# Patient Record
Sex: FEMALE | Race: WHITE | NOT HISPANIC OR LATINO | Employment: UNEMPLOYED | URBAN - METROPOLITAN AREA
[De-identification: names, ages, dates, MRNs, and addresses within clinical notes are randomized per-mention and may not be internally consistent; named-entity substitution may affect disease eponyms.]

---

## 2017-01-01 ENCOUNTER — TELEPHONE (OUTPATIENT)
Dept: PHARMACY | Facility: CLINIC | Age: 62
End: 2017-01-01

## 2017-01-01 ENCOUNTER — TRANSFERRED RECORDS (OUTPATIENT)
Dept: HEALTH INFORMATION MANAGEMENT | Facility: CLINIC | Age: 62
End: 2017-01-01

## 2017-01-01 ENCOUNTER — MYC MEDICAL ADVICE (OUTPATIENT)
Dept: INFECTIOUS DISEASES | Facility: CLINIC | Age: 62
End: 2017-01-01

## 2017-01-01 ENCOUNTER — ONCOLOGY VISIT (OUTPATIENT)
Dept: ONCOLOGY | Facility: CLINIC | Age: 62
End: 2017-01-01
Attending: PHYSICIAN ASSISTANT
Payer: COMMERCIAL

## 2017-01-01 ENCOUNTER — TELEPHONE (OUTPATIENT)
Dept: INFECTIOUS DISEASES | Facility: CLINIC | Age: 62
End: 2017-01-01

## 2017-01-01 ENCOUNTER — HEALTH MAINTENANCE LETTER (OUTPATIENT)
Age: 62
End: 2017-01-01

## 2017-01-01 ENCOUNTER — CARE COORDINATION (OUTPATIENT)
Dept: ONCOLOGY | Facility: CLINIC | Age: 62
End: 2017-01-01

## 2017-01-01 ENCOUNTER — OFFICE VISIT (OUTPATIENT)
Dept: INFECTIOUS DISEASES | Facility: CLINIC | Age: 62
End: 2017-01-01
Attending: INTERNAL MEDICINE
Payer: MEDICARE

## 2017-01-01 ENCOUNTER — MEDICAL CORRESPONDENCE (OUTPATIENT)
Dept: HEALTH INFORMATION MANAGEMENT | Facility: CLINIC | Age: 62
End: 2017-01-01

## 2017-01-01 VITALS
DIASTOLIC BLOOD PRESSURE: 82 MMHG | TEMPERATURE: 98 F | BODY MASS INDEX: 24.8 KG/M2 | HEIGHT: 63 IN | WEIGHT: 140 LBS | SYSTOLIC BLOOD PRESSURE: 127 MMHG | RESPIRATION RATE: 18 BRPM | OXYGEN SATURATION: 97 % | HEART RATE: 110 BPM

## 2017-01-01 VITALS
BODY MASS INDEX: 25.5 KG/M2 | SYSTOLIC BLOOD PRESSURE: 147 MMHG | DIASTOLIC BLOOD PRESSURE: 73 MMHG | WEIGHT: 143.9 LBS | TEMPERATURE: 98.2 F | HEIGHT: 63 IN | HEART RATE: 105 BPM

## 2017-01-01 VITALS
DIASTOLIC BLOOD PRESSURE: 74 MMHG | SYSTOLIC BLOOD PRESSURE: 142 MMHG | HEART RATE: 108 BPM | BODY MASS INDEX: 25.66 KG/M2 | HEIGHT: 63 IN | TEMPERATURE: 98.3 F | WEIGHT: 144.8 LBS

## 2017-01-01 DIAGNOSIS — B37.9 CANDIDA INFECTION: Primary | ICD-10-CM

## 2017-01-01 DIAGNOSIS — R53.82 CHRONIC FATIGUE: ICD-10-CM

## 2017-01-01 DIAGNOSIS — B37.9 CANDIDA INFECTION: ICD-10-CM

## 2017-01-01 DIAGNOSIS — B27.90 EPSTEIN BARR VIRUS INFECTION: Primary | ICD-10-CM

## 2017-01-01 DIAGNOSIS — B27.90 EPSTEIN BARR VIRUS INFECTION: ICD-10-CM

## 2017-01-01 DIAGNOSIS — R59.0 HILAR ADENOPATHY: ICD-10-CM

## 2017-01-01 DIAGNOSIS — D89.42 IDIOPATHIC MAST CELL ACTIVATION SYNDROME (H): Primary | ICD-10-CM

## 2017-01-01 DIAGNOSIS — R94.5 ABNORMAL RESULTS OF LIVER FUNCTION STUDIES: ICD-10-CM

## 2017-01-01 DIAGNOSIS — D84.9 IMMUNODEFICIENCY (H): Primary | ICD-10-CM

## 2017-01-01 LAB
1,3 BETA GLUCAN SER-MCNC: 392 NG/ML
1,3 BETA GLUCAN SER-MCNC: ABNORMAL NG/ML
ALBUMIN SERPL-MCNC: 3.9 G/DL (ref 3.4–5)
ALBUMIN SERPL-MCNC: 4 G/DL (ref 3.4–5)
ALP SERPL-CCNC: 74 U/L (ref 40–150)
ALP SERPL-CCNC: 97 U/L (ref 40–150)
ALT SERPL W P-5'-P-CCNC: 38 U/L (ref 0–50)
ALT SERPL W P-5'-P-CCNC: 51 U/L (ref 0–50)
AST SERPL W P-5'-P-CCNC: 40 U/L (ref 0–45)
AST SERPL W P-5'-P-CCNC: 52 U/L (ref 0–45)
B-D GLUCAN INTERPRETATION (1,3): ABNORMAL
B-D GLUCAN INTERPRETATION (1,3): ABNORMAL
BACTERIA SPEC CULT: NO GROWTH
BASOPHILS # BLD AUTO: 0 10*3/UL
BASOPHILS NFR BLD AUTO: 1 %
BILIRUB DIRECT SERPL-MCNC: 0.1 MG/DL (ref 0–0.2)
BILIRUB DIRECT SERPL-MCNC: 0.1 MG/DL (ref 0–0.2)
BILIRUB SERPL-MCNC: 0.4 MG/DL (ref 0.2–1.3)
BILIRUB SERPL-MCNC: 0.5 MG/DL (ref 0.2–1.3)
CRP SERPL-MCNC: <2.9 MG/L (ref 0–8)
EBV AB VCA, IGG: 347 U/ML (ref 0–17.9)
EBV AB VCA, IGM: 116 U/ML (ref 0–35.9)
EBV CAPSID AB (IGG) - QUEST: ABNORMAL
EBV CAPSID AB (IGM) - QUEST: ABNORMAL
EBV EARLY ANTIGEN AB, IGG: 102 U/ML (ref 0–8.9)
EBV INTERPRETATION - QUEST: ABNORMAL
EBV INTERPRETATION - QUEST: ABNORMAL
EBV NUCLEAR ANTIGEN AB, IGG: 295 U/ML (ref 0–17.9)
EOSINOPHIL # BLD AUTO: 0.1 10*3/UL
EOSINOPHIL NFR BLD AUTO: 3 %
ERYTHROCYTE [DISTWIDTH] IN BLOOD BY AUTOMATED COUNT: 13.3 %
ERYTHROCYTE [SEDIMENTATION RATE] IN BLOOD BY WESTERGREN METHOD: 24 MM/H (ref 0–30)
HCT VFR BLD AUTO: 37.1 %
HEMOGLOBIN: 12.6 G/DL (ref 11.7–15.7)
IGM SERPL-MCNC: 287 MG/DL (ref 60–265)
IGM SERPL-MCNC: 290 MG/DL (ref 60–265)
IMMATURE GRANS (ABS): 0 X10E3/UL
IMMATURE GRANULOCYTES: 0 %
LYMPHOCYTES # BLD AUTO: 1.1 10*3/UL
LYMPHOCYTES NFR BLD AUTO: 26 %
Lab: NORMAL
M TB TUBERC IFN-G BLD QL: NEGATIVE
M TB TUBERC IFN-G/MITOGEN IGNF BLD: 0 IU/ML
MCH RBC QN AUTO: 34.2 PG
MCHC RBC AUTO-ENTMCNC: 34 G/DL
MCV RBC AUTO: 101 FL
MICRO REPORT STATUS: NORMAL
MICRO REPORT STATUS: NORMAL
MONOCYTES # BLD AUTO: 0.4 10*3/UL
MONOCYTES NFR BLD AUTO: 9 %
MYCOBACTERIUM SPEC CULT: NORMAL
NEUTROPHILS # BLD AUTO: 2.7 10*3/UL
NEUTROPHILS NFR BLD AUTO: 61 %
PLATELET COUNT - QUEST: 165 10^9/L (ref 150–450)
PROT SERPL-MCNC: 8 G/DL (ref 6.8–8.8)
PROT SERPL-MCNC: 8.2 G/DL (ref 6.8–8.8)
RBC # BLD AUTO: 3.68 10^12/L
SPECIMEN SOURCE: NORMAL
SPECIMEN SOURCE: NORMAL
VITAMIN D, 25-HYDROXY: 56.2 NG/ML (ref 30–100)
WBC # BLD AUTO: 4.4 10^9/L

## 2017-01-01 PROCEDURE — 25000125 ZZHC RX 250: Mod: ZF

## 2017-01-01 PROCEDURE — 80076 HEPATIC FUNCTION PANEL: CPT | Performed by: INTERNAL MEDICINE

## 2017-01-01 PROCEDURE — 99212 OFFICE O/P EST SF 10 MIN: CPT | Mod: ZF

## 2017-01-01 PROCEDURE — 90715 TDAP VACCINE 7 YRS/> IM: CPT | Mod: ZF

## 2017-01-01 PROCEDURE — 99213 OFFICE O/P EST LOW 20 MIN: CPT | Mod: 25,ZF

## 2017-01-01 PROCEDURE — 82784 ASSAY IGA/IGD/IGG/IGM EACH: CPT | Performed by: INTERNAL MEDICINE

## 2017-01-01 PROCEDURE — 86480 TB TEST CELL IMMUN MEASURE: CPT | Performed by: INTERNAL MEDICINE

## 2017-01-01 PROCEDURE — 85652 RBC SED RATE AUTOMATED: CPT | Performed by: INTERNAL MEDICINE

## 2017-01-01 PROCEDURE — 90471 IMMUNIZATION ADMIN: CPT | Mod: ZF

## 2017-01-01 PROCEDURE — 87449 NOS EACH ORGANISM AG IA: CPT | Performed by: INTERNAL MEDICINE

## 2017-01-01 PROCEDURE — 36415 COLL VENOUS BLD VENIPUNCTURE: CPT | Performed by: INTERNAL MEDICINE

## 2017-01-01 PROCEDURE — 87116 MYCOBACTERIA CULTURE: CPT | Performed by: INTERNAL MEDICINE

## 2017-01-01 PROCEDURE — 86140 C-REACTIVE PROTEIN: CPT | Performed by: INTERNAL MEDICINE

## 2017-01-01 PROCEDURE — 87040 BLOOD CULTURE FOR BACTERIA: CPT | Performed by: INTERNAL MEDICINE

## 2017-01-01 PROCEDURE — 99214 OFFICE O/P EST MOD 30 MIN: CPT | Mod: ZP | Performed by: PHYSICIAN ASSISTANT

## 2017-01-01 RX ORDER — CYCLOBENZAPRINE HCL 10 MG
10 TABLET ORAL
COMMUNITY
Start: 2014-11-19 | End: 2017-01-01

## 2017-01-01 RX ORDER — VALACYCLOVIR HYDROCHLORIDE 1 G/1
TABLET, FILM COATED ORAL
Refills: 5 | COMMUNITY
Start: 2016-01-01 | End: 2017-01-01

## 2017-01-01 RX ORDER — AMITRIPTYLINE HYDROCHLORIDE 10 MG/1
10 TABLET ORAL
COMMUNITY
Start: 2017-01-01 | End: 2017-01-01

## 2017-01-01 RX ORDER — PHYTONADIONE 2 MG/ML
INJECTION, EMULSION INTRAMUSCULAR; INTRAVENOUS; SUBCUTANEOUS
COMMUNITY

## 2017-01-01 RX ORDER — LANOLIN ALCOHOL/MO/W.PET/CERES
400 CREAM (GRAM) TOPICAL
COMMUNITY
Start: 2010-09-03

## 2017-01-01 RX ORDER — LIDOCAINE/PRILOCAINE 2.5 %-2.5%
CREAM (GRAM) TOPICAL
COMMUNITY
Start: 2015-12-21

## 2017-01-01 RX ORDER — UBIDECARENONE 100 MG
100 CAPSULE ORAL
COMMUNITY
Start: 2010-09-03

## 2017-01-01 RX ORDER — FLUCONAZOLE 100 MG/1
100 TABLET ORAL DAILY
Qty: 90 TABLET | Refills: 3 | COMMUNITY
Start: 2017-01-01

## 2017-01-01 RX ORDER — ROPINIROLE 0.25 MG/1
TABLET, FILM COATED ORAL
Refills: 2 | COMMUNITY
Start: 2016-01-01 | End: 2017-01-01

## 2017-01-01 RX ORDER — LORAZEPAM 0.5 MG/1
0.5 TABLET ORAL PRN
Qty: 60 TABLET | Refills: 0 | COMMUNITY
Start: 2017-01-01

## 2017-01-01 RX ORDER — PROCHLORPERAZINE 25 MG
25 SUPPOSITORY, RECTAL RECTAL PRN
Qty: 20 SUPPOSITORY | Refills: 1 | COMMUNITY
Start: 2017-01-01

## 2017-01-01 RX ORDER — VALACYCLOVIR HYDROCHLORIDE 500 MG/1
TABLET, FILM COATED ORAL
COMMUNITY
Start: 2014-11-19 | End: 2017-01-01

## 2017-01-01 RX ORDER — CITALOPRAM HYDROBROMIDE 20 MG/10ML
SOLUTION, ORAL ORAL
COMMUNITY
Start: 2016-01-01 | End: 2017-01-01

## 2017-01-01 RX ORDER — LEVOTHYROXINE SODIUM 100 UG/1
TABLET ORAL
COMMUNITY
Start: 2016-01-01

## 2017-01-01 RX ORDER — LORAZEPAM 1 MG/1
1 TABLET ORAL
COMMUNITY
Start: 2012-04-25

## 2017-01-01 RX ORDER — PROCHLORPERAZINE MALEATE 5 MG
5 TABLET ORAL
COMMUNITY
Start: 2015-12-03 | End: 2017-01-01 | Stop reason: ALTCHOICE

## 2017-01-01 RX ORDER — NALTREXONE HYDROCHLORIDE 50 MG/1
50 TABLET, FILM COATED ORAL
COMMUNITY
Start: 2015-01-30 | End: 2015-01-07

## 2017-01-01 RX ORDER — RALOXIFENE HYDROCHLORIDE 60 MG/1
60 TABLET, FILM COATED ORAL
COMMUNITY
Start: 2017-01-01

## 2017-01-01 RX ORDER — TEMAZEPAM 15 MG/1
CAPSULE ORAL
COMMUNITY
Start: 2014-12-05

## 2017-01-01 RX ORDER — CHOLESTYRAMINE 4 G/9G
2 POWDER, FOR SUSPENSION ORAL
COMMUNITY
End: 2017-01-01

## 2017-01-01 RX ORDER — MULTIVIT-MIN/IRON/FOLIC ACID/K 18-600-40
1300 CAPSULE ORAL
COMMUNITY

## 2017-01-01 RX ORDER — AMOXICILLIN 500 MG/1
CAPSULE ORAL
Refills: 4 | COMMUNITY
Start: 2016-01-01

## 2017-01-01 RX ORDER — OMEGA-3/DHA/EPA/FISH OIL 60 MG-90MG
CAPSULE ORAL
COMMUNITY

## 2017-01-01 RX ORDER — METHOCARBAMOL 500 MG/1
500 TABLET, FILM COATED ORAL
COMMUNITY
Start: 2015-10-30

## 2017-01-01 RX ORDER — CITALOPRAM HYDROBROMIDE 10 MG/1
TABLET ORAL
COMMUNITY
Start: 2013-08-30 | End: 2017-01-01

## 2017-01-01 RX ORDER — FLUOROMETHOLONE 0.1 %
SUSPENSION, DROPS(FINAL DOSAGE FORM)(ML) OPHTHALMIC (EYE)
Refills: 4 | COMMUNITY
Start: 2016-01-01 | End: 2017-01-01

## 2017-01-01 RX ORDER — PYRIDOXINE HCL (VITAMIN B6) 100 MG
TABLET ORAL
COMMUNITY

## 2017-01-01 RX ORDER — FAMCICLOVIR 125 MG/1
500 TABLET ORAL 2 TIMES DAILY
COMMUNITY
Start: 2017-01-01

## 2017-01-01 RX ORDER — VALACYCLOVIR HYDROCHLORIDE 1 G/1
TABLET, FILM COATED ORAL
COMMUNITY
Start: 2016-08-02 | End: 2017-01-01

## 2017-01-01 RX ORDER — RIFAMPIN 300 MG/1
CAPSULE ORAL
COMMUNITY
End: 2017-01-01

## 2017-01-01 RX ORDER — LIOTHYRONINE SODIUM 5 UG/1
TABLET ORAL
COMMUNITY
Start: 2016-01-01

## 2017-01-01 RX ORDER — VALGANCICLOVIR 450 MG/1
450 TABLET, FILM COATED ORAL DAILY
COMMUNITY
Start: 2017-01-01 | End: 2017-01-01

## 2017-01-01 RX ORDER — VALGANCICLOVIR 450 MG/1
450 TABLET, FILM COATED ORAL DAILY
COMMUNITY
Start: 2017-01-01

## 2017-01-01 RX ORDER — VALGANCICLOVIR 450 MG/1
450 TABLET, FILM COATED ORAL DAILY
COMMUNITY
End: 2017-01-01

## 2017-01-01 RX ORDER — VITAMIN B COMPLEX
1 TABLET ORAL
COMMUNITY

## 2017-01-01 RX ORDER — LORAZEPAM 0.5 MG/1
TABLET ORAL
COMMUNITY
Start: 2016-01-01 | End: 2017-01-01

## 2017-01-01 RX ORDER — CALCIUM CARB/VITAMIN D3/VIT K1 500-500-40
400 TABLET,CHEWABLE ORAL
COMMUNITY

## 2017-01-01 RX ORDER — LEVOTHYROXINE SODIUM 100 UG/1
TABLET ORAL
Refills: 2 | COMMUNITY
Start: 2016-01-01 | End: 2017-01-01

## 2017-01-01 RX ORDER — MELOXICAM 7.5 MG/1
TABLET ORAL
COMMUNITY
Start: 2016-01-01

## 2017-01-01 RX ORDER — RIBONUCLEIC ACID (RNA)
POWDER (GRAM) MISCELLANEOUS PRN
COMMUNITY

## 2017-01-01 RX ORDER — MULTIVIT WITH MINERALS/LUTEIN
2 TABLET ORAL
COMMUNITY
End: 2017-01-01

## 2017-01-01 RX ORDER — LORAZEPAM 0.5 MG/1
TABLET ORAL
Refills: 1 | COMMUNITY
Start: 2016-01-01 | End: 2017-01-01

## 2017-01-01 ASSESSMENT — PAIN SCALES - GENERAL
PAINLEVEL: SEVERE PAIN (7)
PAINLEVEL: NO PAIN (0)
PAINLEVEL: NO PAIN (0)

## 2017-01-13 NOTE — NURSING NOTE
"Chief Complaint   Patient presents with     RECHECK     follow up with llois barr infection, [facility]       Initial /73 mmHg  Pulse 105  Temp(Src) 98.2  F (36.8  C) (Oral)  Ht 1.588 m (5' 2.5\")  Wt 65.273 kg (143 lb 14.4 oz)  BMI 25.88 kg/m2  LMP 01/01/2005 Estimated body mass index is 25.88 kg/(m^2) as calculated from the following:    Height as of this encounter: 1.588 m (5' 2.5\").    Weight as of this encounter: 65.273 kg (143 lb 14.4 oz).  BP completed using cuff size: regular    "
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
## 2017-01-13 NOTE — MR AVS SNAPSHOT
After Visit Summary   1/13/2017    Brenda Kelly    MRN: [id number]           Patient Information     Date Of Birth          1955        Visit Information        Provider Department      1/13/2017 8:00 AM Lila Bowers MD [facility]        Today's Diagnoses     Abnormal results of liver function studies    -  1     Chronic fatigue            Follow-ups after your visit        Follow-up notes from your care team     Return in about 3 months (around 4/13/2017).      Your next 10 appointments already scheduled     Jan 13, 2017  9:45 AM   Lab with  LAB   [facility] ([facility])    [address] [place] [address]  1st Floor  [facility] [address]   [phone]            Feb 03, 2017  8:40 AM   (Arrive by 8:25 AM)   Return Visit with Sarah Pelletier PA-C   [facility] ([facility])    [address] [place] [address]  [address] [facility] [address]   [phone]            Apr 05, 2017 10:00 AM   (Arrive by 9:45 AM)   Return Visit with Lila Bowers MD   [facility] ([facility])    [address] [place] [address]  3rd Floor  [facility] [address]   [phone]            May 26, 2017 10:00 AM   (Arrive by 9:45 AM)   Return Visit with Britton Fontenot MD   [facility] ([facility])    [address] [place] [address]  [address] [facility] [address]   [phone]              Future tests that were ordered for you today     Open Future Orders        Priority Expected Expires Ordered    1,3 Beta D glucan fungitell Routine 1/13/2017 1/13/2018 1/13/2017    IgM Routine 1/13/2017 1/13/2018 1/13/2017            Who to contact     If you have questions or need follow up information about today's clinic visit or your schedule please contact [facility] INFECTIOUS DISEASES directly at  "[phone].  Normal or non-critical lab and imaging results will be communicated to you by [facility], letter or phone within 4 business days after the clinic has received the results. If you do not hear from us within 7 days, please contact the clinic through [facility] or phone. If you have a critical or abnormal lab result, we will notify you by phone as soon as possible.  Submit refill requests through [facility] or call your pharmacy and they will forward the refill request to us. Please allow 3 business days for your refill to be completed.          Additional Information About Your Visit        [facility] Information     [facility] gives you secure access to your electronic health record. If you see a primary care provider, you can also send messages to your care team and make appointments. If you have questions, please call your primary care clinic.  If you do not have a primary care provider, please call [phone] and they will assist you.        Care EveryWhere ID     This is your Care EveryWhere ID. This could be used by other organizations to access your [place] medical records  [id number]        Your Vitals Were     Pulse Temperature Height BMI (Body Mass Index) Last Period       105 98.2  F (36.8  C) (Oral) 1.588 m (5' 2.5\") 25.88 kg/m2 01/01/2005        Blood Pressure from Last 3 Encounters:   01/13/17 147/73   12/15/16 138/74   12/07/16 152/88    Weight from Last 3 Encounters:   01/13/17 65.273 kg (143 lb 14.4 oz)   12/15/16 65.545 kg (144 lb 8 oz)   12/07/16 64.864 kg (143 lb)                 Today's Medication Changes          These changes are accurate as of: 1/13/17  8:54 AM.  If you have any questions, ask your nurse or doctor.               These medicines have changed or have updated prescriptions.        Dose/Directions    temazepam 15 MG capsule   Commonly known as:  RESTORIL   This may have changed:  how much to take   Used for:  Sleep problems, CA - breast cancer        Dose:  15 mg   Take 1 " capsule (15 mg) by mouth nightly as needed   Quantity:  30 capsule   Refills:  3                Primary Care Provider Office Phone # Fax #    Hafsa Campos [phone]       [facility] [address]  [facility] [address]        Thank you!     Thank you for choosing [facility] INFECTIOUS DISEASES  for your care. Our goal is always to provide you with excellent care. Hearing back from our patients is one way we can continue to improve our services. Please take a few minutes to complete the written survey that you may receive in the mail after your visit with us. Thank you!             Your Updated Medication List - Protect others around you: Learn how to safely use, store and throw away your medicines at www.disposemymeds.org.          This list is accurate as of: 1/13/17  8:54 AM.  Always use your most recent med list.                   Brand Name Dispense Instructions for use    ALPHA LIPOIC ACID PO      400mg daily       AMITRIPTYLINE HCL PO      Take 10 mg by mouth daily       ASHWAGANDHA PO      daily       B-12 PO      1500 mcg B12 adenocobalamin       CALCIUM + D PO      500mg calcium & 500 units of D3 daily       cetirizine 10 MG tablet    zyrTEC     Take 10 mg by mouth daily as needed for allergies       Co Q 10 100 MG Caps     60 capsule    Take 200 mg by mouth daily       CYTOMEL PO      Take 10 mcg by mouth daily       fluconazole 100 MG tablet    DIFLUCAN    90 tablet    Take 1 tablet (100 mg) by mouth daily       GAMMAGARD 30 GM/300ML Soln   Generic drug:  Immune Globulin (Human)     1 vial    Inject 300 mLs (30 g) into the vein every 21 days       Hormone Cream Base Crea      Compounded estriol vaginal cream 0.5 mg;  Apply once weekly       Levothyroxine Sodium 100 MCG Caps      Take 100 mcg by mouth daily.       loratadine 10 MG capsule      Take 10 mg by mouth daily       MELOXICAM PO      Take 7.5 mg by mouth as needed       METHOCARBAMOL PO      " Take 500 mg by mouth as needed       MILK THISTLE PO      Take 375 mg by mouth daily       MOLYBDENUM PO      Molybdenum 62.5 mcg with l-glutathione 500 mg daily       NEW MED      Glutathione 1 gram/day       OMEGA-3 FISH OIL PO      Omega 3:  2000 mg ( mg and  mg) daily       Pregnenolone Micronized Powd     1 Bottle    Unknown dose, taken prn when she \"feels like I've been run over by a train.\"       pyridoxine 100 MG tablet    VITAMIN B-6     Take 100 mg by mouth daily       raloxifene 60 MG tablet    Evista     Take 60 mg by mouth daily       ranitidine 150 MG capsule    ZANTAC    180 capsule    Take 1 capsule (150 mg) by mouth daily       temazepam 15 MG capsule    RESTORIL    30 capsule    Take 1 capsule (15 mg) by mouth nightly as needed       * UNABLE TO FIND      Thy-1:  Contains vitam A, C, D, E, B2, niacinamide, iodine zinc, selenium, l-tyrosine, guggl resin, tumeric, rosemary       * UNABLE TO FIND      Nut 950:  Contains:  Vit A, C, D, E, B1, B2, ribo 5 phosphate, niacinamide, inositol, B6, P5P, MTHF, B12, biotin, pantothenic acid, calcium citrate, iodine, magnesium, zinc, selenixum, manganese, vanadium, chromium, potassium, boron       * UNABLE TO FIND      NADH 5 mg       * UNABLE TO FIND      DD1:  l-mehtionine, l glycine, taurine, NCA, milk thistle, alpha lipoic acid       * UNABLE TO FIND      Calcium D Glucarate 600 mg daily       * UNABLE TO FIND      [id number]:  Amino acid mix daily       * UNABLE TO FIND      P5P 50 mg with Manganese 8 mg daily       * UNABLE TO FIND      ARI 20,000-40,000 units 15 minutes before each meal       valGANciclovir 450 MG tablet    VALCYTE     Take 450 mg by mouth daily       VITAMIN B COMPLEX PO      Take 1 tablet by mouth daily       VITAMIN D3 PO      Take by mouth daily Tocotrienol product;  Also includes Vit D 1666 units & Vit E 400 units       VITAMIN K PO      Vit K1 1000 mcg, K2-Mk4 1000 mcg, Vit K2 200 mcg daily       zinc 50 MG Tabs      Take " 1 tablet by mouth daily       * Notice:  This list has 8 medication(s) that are the same as other medications prescribed for you. Read the directions carefully, and ask your doctor or other care provider to review them with you.

## 2017-01-24 NOTE — PROGRESS NOTES
"Received message from pt today stating that she is not sure that she \"buys\" into diagnosis of MCAS and not sure if appt with Maribel will be helpful for her.  Requested call back.  Spoke with pt who stated she has been having multiple health issues and appts at this time including ID found something \"new\" and she has heart valve that is not good.  Reports she has appt at [facility] for second opinion.  Also stated she does not really buy into MCAS diagnosis and asking what the goal is for appt with Sarah Pelletier PA-C (currently scheduled for 2/3/17).    RN reviewed that Maribel would review how she is doing with the changes Dr. Fontenot had suggested at her last visit such as increasing ARI. She reported that she did try to increase as suggested by Dr. Fontenot but stopped because she got sick.  She went on to say she does not use the ARI supplement everyday.  Asked about ARI testing.  RN stated that Dr. Fontenot has asked to get that testing available but thus far has not been able to get it.    "

## 2017-02-22 NOTE — TELEPHONE ENCOUNTER
Pt faxed labs to clinic. Placed in Dr. Bowers's box to review. Message sent to notify her.  Gemma Freire RN

## 2017-02-22 NOTE — TELEPHONE ENCOUNTER
Pt called about recent labs she had drawn at [facility]. She is concerned because her WBC count has gone from 6.8 in November to 3.12 on 2/7/17. She is wondering if this could be caused by the valcyte or diflucan she is on. She has also been having stomach upset and constipation since starting the diflucan. She denies diarrhea, but says when she stopped taking diflucan for a few days, the stomach issues subsided. She is taking diflucan again but is wondering what she can do to mitigate side effects. Message sent to Dr. oBwers with pt's concerns.  Gemma Freire RN

## 2017-02-28 NOTE — NURSING NOTE
"Brenda Kelly is a 61 year old female who presents for:  Chief Complaint   Patient presents with     Oncology Clinic Visit     return patient for follow up visit related to Ductal carcinoma in situ of left breast        Initial Vitals:  /82 (BP Location: Left arm)  Pulse 110  Temp 98  F (36.7  C) (Oral)  Resp 18  Ht 1.588 m (5' 2.5\")  Wt 63.5 kg (140 lb)  LMP 01/01/2005  SpO2 97%  BMI 25.2 kg/m2 Estimated body mass index is 25.2 kg/(m^2) as calculated from the following:    Height as of this encounter: 1.588 m (5' 2.5\").    Weight as of this encounter: 63.5 kg (140 lb).. Body surface area is 1.67 meters squared. BP completed using cuff size: large  No Pain (0) Patient's last menstrual period was 01/01/2005. Allergies and medications reviewed.     Medications: Medication refills not needed today.  Pharmacy name entered into [facility]:    [id number] - [place], [place] - [address]  [id number] - [place], [place] - [address]    Comments: patient denied pain/discomfort.    5 minutes for nursing intake (face to face time)   Caryn Salinas CMA        "

## 2017-02-28 NOTE — LETTER
"2/28/2017      RE: Brenda Kelly  [address]   [place] [address]       [facility]  FOLLOW-UP VISIT NOTE  Date of visit: 2-28-17          REASON FOR VISIT: MCAS follow up    HPI: Brenda is a 61 year old female with a history of a bicuspid aortic valve diagnosed at [age] years old and chronic EBV.  She sees multiple cardiologist as she has been struggling with whether or not to have this fixed.  [facility].  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  Please see his note for details of her PMH.  The following was cut/paste from his note regarding details of her diagnosis:     \"Current Diagnosis: Based on (1) a clinical history highly consistent with chronic/recurrent aberrant mast cell  release, (2) multiple elevated mast cell  levels in '15 (plasma heparin 0.05 anti-Factor Xa units/ml (upper normal 0.02 per Hector et al., J Thrombosis Haemostasis 2011), serum chromogranin A 127 ng/ml (with no evident heart or renal failure or neuroendocrine cancer or PPI use at the time), and 24-hour urinary 11-beta-prostaglandin-F2-alpha 1161 ng/24h (normal <= 1000)) (but normal serum tryptases), and (3) absence of any other evident disease better accounting for the full range and chronicity of the symptoms and findings here, mast cell activation syndrome (MCAS; 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 Kelly's lifelong complex of multisystem polymorbidity of generally inflammatory theme.\"    INTERVAL HISTORY: this is my first time meeting Brenda today.  She is still pondering whether or not it would be beneficial for her to have her bicuspid aortic valve replaced.  While most of her is inclined to do so, she knows that surgery can be difficult for mast-cell patients.  On the other hand- most of they physicians she sees tells her " "\"you don't have mast cell disease\" thus she wonders if she is putting off a surgery for an ailment she may not have.  She does have chronic fatigue, but wonders if this is from her chronic EBV.  She takes Valtrex and feels better on this and can tell if she even misses a dose. She was put on fluconazole but stopped as she got a stomach ache from it.  She has multiple food sensitivities as well.  She has many foods she avoids.  Also reactive to molds. She is currently taking 10 mg BID of loratidine and 150 mg BID of ranitidine.     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  Brenda had a few questions today regarding Dr Segal's role in the bob-operative period.  I encouraged if she does proceed with surgery that her anesthesiologist contact Dr Segal.  She expressed her concern regarding other physicians opinions regarding mast cell which makes it difficult for her.  She is taking many supplements at this time and is not very keen on taking more prescription medications.  We reviewed Dr Segal's note regarding possible supplements and dosing.  I reviewed with her ARI supplements, which she has taken before in low doses but had stopped as the  stopped making the actual brand/dose she was responding to.  We also reviewed quercetin, ALA, NAC and ASA as options in the future as well.     Maribel Pelletier PA-C    Over 25 min of direct face to face time spent " with patient with more than 50% time spent in counseling and coordinating care.            Sarah Pelletier PA-C

## 2017-02-28 NOTE — MR AVS SNAPSHOT
After Visit Summary   2/28/2017    Brenda Kelly    MRN: [id number]           Patient Information     Date Of Birth          1955        Visit Information        Provider Department      2/28/2017 11:40 AM Sarah Pelletier PA-C [facility]        Today's Diagnoses     Idiopathic mast cell activation syndrome    -  1       Follow-ups after your visit        Your next 10 appointments already scheduled     Apr 05, 2017 10:00 AM CDT   (Arrive by 9:45 AM)   Return Visit with Lila Bowers MD   [facility] and Infectious Diseases ([facility])    [address] [place] [address]  3rd [facility] [id number] [phone]            May 26, 2017 10:00 AM CDT   (Arrive by 9:45 AM)   Return Visit with Britton Fontenot MD   [facility] ([facility])    [address] [place] [address]  2nd [facility] [address] [phone]              Who to contact     If you have questions or need follow up information about today's clinic visit or your schedule please contact [facility] CANCER [facility] directly at [phone].  Normal or non-critical lab and imaging results will be communicated to you by MyChart, letter or phone within 4 business days after the clinic has received the results. If you do not hear from us within 7 days, please contact the clinic through [employer] or phone. If you have a critical or abnormal lab result, we will notify you by phone as soon as possible.  Submit refill requests through [employer] or call your pharmacy and they will forward the refill request to us. Please allow 3 business days for your refill to be completed.          Additional Information About Your Visit        MyChart Information     [employer] gives you secure access to your electronic health record. If you see a primary care provider, you can also send messages to your care team and make appointments. If you have  "questions, please call your primary care clinic.  If you do not have a primary care provider, please call [phone] and they will assist you.        Care EveryWhere ID     This is your Care EveryWhere ID. This could be used by other organizations to access your [place] medical records  [id number]        Your Vitals Were     Pulse Temperature Respirations Height Last Period Pulse Oximetry    110 98  F (36.7  C) (Oral) 18 1.588 m (5' 2.5\") 01/01/2005 97%    BMI (Body Mass Index)                   25.2 kg/m2            Blood Pressure from Last 3 Encounters:   02/28/17 127/82   01/13/17 147/73   12/15/16 138/74    Weight from Last 3 Encounters:   02/28/17 63.5 kg (140 lb)   01/13/17 65.3 kg (143 lb 14.4 oz)   12/15/16 65.5 kg (144 lb 8 oz)              Today, you had the following     No orders found for display         Today's Medication Changes          These changes are accurate as of: 2/28/17 11:59 PM.  If you have any questions, ask your nurse or doctor.               These medicines have changed or have updated prescriptions.        Dose/Directions    Alpha-Lipoic Acid 200 MG Tabs   This may have changed:  Another medication with the same name was removed. Continue taking this medication, and follow the directions you see here.   Changed by:  Sarah Pelletier PA-C        Dose:  400 mg   Take 400 mg by mouth   Refills:  0       amitriptyline 10 MG tablet   Commonly known as:  ELAVIL   This may have changed:  Another medication with the same name was removed. Continue taking this medication, and follow the directions you see here.   Changed by:  Sarah Pelletier PA-C        Dose:  10 mg   Take 10 mg by mouth   Refills:  0       Fish Oil 500 MG Caps   This may have changed:  Another medication with the same name was removed. Continue taking this medication, and follow the directions you see here.   Changed by:  Sarah Pelletier PA-C        Refills:  0       GAMMAGARD 20 GM/200ML Soln   This may have " changed:  Another medication with the same name was removed. Continue taking this medication, and follow the directions you see here.   Generic drug:  Immune Globulin (Human)   Changed by:  Sarah Pelletier PA-C        Dose:  31108 mg   Inject 25,000 mg into the vein   Refills:  0       levothyroxine 100 MCG tablet   Commonly known as:  SYNTHROID/LEVOTHROID   This may have changed:  Another medication with the same name was removed. Continue taking this medication, and follow the directions you see here.   Changed by:  Sarah Pelletier PA-C        TAKE ONE TABLET BY MOUTH EVERY DAY BEFORE MORNING MEAL   Refills:  0       liothyronine 5 MCG tablet   Commonly known as:  CYTOMEL   This may have changed:  Another medication with the same name was removed. Continue taking this medication, and follow the directions you see here.   Changed by:  Sarah Pelletier PA-C        TAKE 1 TO 2 TABLETS BY MOUTH EVERY DAY   Refills:  0       meloxicam 7.5 MG tablet   Commonly known as:  MOBIC   This may have changed:  Another medication with the same name was removed. Continue taking this medication, and follow the directions you see here.   Changed by:  Sarah Pelletier PA-C        Reported on 2/28/2017   Refills:  0       methocarbamol 500 MG tablet   Commonly known as:  ROBAXIN   This may have changed:  Another medication with the same name was removed. Continue taking this medication, and follow the directions you see here.   Changed by:  Sarah Pelletier PA-C        Dose:  500 mg   Take 500 mg by mouth Reported on 2/28/2017   Refills:  0       RA MILK THISTLE 200 MG Caps   This may have changed:  Another medication with the same name was removed. Continue taking this medication, and follow the directions you see here.   Generic drug:  Milk Thistle   Changed by:  Sarah Pelletier PA-C        Take as directed.   Refills:  0       raloxifene 60 MG tablet   Commonly known as:  Evista   This may have changed:   Another medication with the same name was removed. Continue taking this medication, and follow the directions you see here.   Changed by:  Sarah Pelletier PA-C        Dose:  60 mg   Take 60 mg by mouth   Refills:  0       SM COENZYME Q-10 100 MG Caps capsule   This may have changed:  Another medication with the same name was removed. Continue taking this medication, and follow the directions you see here.   Generic drug:  co-enzyme Q-10   Changed by:  Sarah Pelletier PA-C        Dose:  100 mg   Take 100 mg by mouth   Refills:  0       temazepam 15 MG capsule   Commonly known as:  RESTORIL   This may have changed:  Another medication with the same name was removed. Continue taking this medication, and follow the directions you see here.   Changed by:  Sarah Pelletier PA-C        1-2 at bedtime.   Refills:  0       vitamin D 2000 UNITS Caps   This may have changed:  Another medication with the same name was removed. Continue taking this medication, and follow the directions you see here.   Changed by:  Sarah Pelletier PA-C        Dose:  1300 Units   Take 1,300 Units by mouth   Refills:  0       Vitamin-B Complex Tabs   This may have changed:  Another medication with the same name was removed. Continue taking this medication, and follow the directions you see here.   Changed by:  Sarah Pelletier PA-C        Dose:  1 tablet   Take 1 tablet by mouth   Refills:  0         Stop taking these medicines if you haven't already. Please contact your care team if you have questions.     Pregnenolone Micronized Powd   Stopped by:  Sarah Pelletier PA-C           valGANciclovir 450 MG tablet   Commonly known as:  VALCYTE   Stopped by:  Sarah Pelletier PA-C                    Primary Care Provider Office Phone # Fax #    Hafsa Campos [phone]       [facility] [address] [place] [address]        Thank you!     Thank you for choosing [facility]  CLINIC  for your care. Our goal is always to provide you with excellent care. Hearing back from our patients is one way we can continue to improve our services. Please take a few minutes to complete the written survey that you may receive in the mail after your visit with us. Thank you!             Your Updated Medication List - Protect others around you: Learn how to safely use, store and throw away your medicines at www.disposemymeds.org.          This list is accurate as of: 2/28/17 11:59 PM.  Always use your most recent med list.                   Brand Name Dispense Instructions for use    Alpha-Lipoic Acid 200 MG Tabs      Take 400 mg by mouth       amitriptyline 10 MG tablet    ELAVIL     Take 10 mg by mouth       amoxicillin 500 MG capsule    AMOXIL     Reported on 2/28/2017       ASHWAGANDHA PO      daily       B-12 PO      1500 mcg B12 adenocobalamin       CALCIUM + D PO      500mg calcium & 500 units of D3 daily       CELEXA PO      Take 5 mLs by mouth daily       cetirizine 10 MG tablet    zyrTEC     Take 10 mg by mouth daily as needed for allergies Reported on 2/28/2017       D-Ribose Powd      as needed Reported on 2/28/2017       Fish Oil 500 MG Caps          fluconazole 100 MG tablet    DIFLUCAN    90 tablet    Take 1 tablet (100 mg) by mouth daily       folic acid 400 MCG tablet    FOLVITE     Take 400 mcg by mouth       GAMMAGARD 20 GM/200ML Soln   Generic drug:  Immune Globulin (Human)      Inject 25,000 mg into the vein       GLUTATHIONE PO      50 mg       Hormone Cream Base Crea      Compounded estriol vaginal cream 0.5 mg;  Apply once weekly       hydrocortisone sodium succinate 100 MG injection    solu-CORTEF     50 mg       Immune Globulin (Human) 2.5 G Solr      Inject 30 g into the vein       Lactobacillus Acidophilus Powd      Use as directed.       levothyroxine 100 MCG tablet    SYNTHROID/LEVOTHROID     TAKE ONE TABLET BY MOUTH EVERY DAY BEFORE MORNING MEAL       lidocaine-prilocaine  cream    EMLA     APPLY TO THE AFFECTED AREA AS DIRECTED 30 MINUTES PRIOR TO IV STARTS       liothyronine 5 MCG tablet    CYTOMEL     TAKE 1 TO 2 TABLETS BY MOUTH EVERY DAY       loratadine 10 MG capsule      Take 10 mg by mouth daily       LORazepam 1 MG tablet    ATIVAN     Take 1 mg by mouth Reported on 2/28/2017       meloxicam 7.5 MG tablet    MOBIC     Reported on 2/28/2017       methocarbamol 500 MG tablet    ROBAXIN     Take 500 mg by mouth Reported on 2/28/2017       MOLYBDENUM PO      Molybdenum 62.5 mcg with l-glutathione 500 mg daily       prochlorperazine 5 MG tablet    COMPAZINE     Take 5 mg by mouth       pyridoxine 100 MG tablet    VITAMIN B-6     Take 100 mg by mouth daily       RA MILK THISTLE 200 MG Caps   Generic drug:  Milk Thistle      Take as directed.       raloxifene 60 MG tablet    Evista     Take 60 mg by mouth       ranitidine 150 MG capsule    ZANTAC    180 capsule    Take 1 capsule (150 mg) by mouth daily       SM COENZYME Q-10 100 MG Caps capsule   Generic drug:  co-enzyme Q-10      Take 100 mg by mouth       temazepam 15 MG capsule    RESTORIL     1-2 at bedtime.       * UNABLE TO FIND      Nut 950:  Contains:  Vit A, C, D, E, B1, B2, ribo 5 phosphate, niacinamide, inositol, B6, P5P, MTHF, B12, biotin, pantothenic acid, calcium citrate, iodine, magnesium, zinc, selenixum, manganese, vanadium, chromium, potassium, boron       * UNABLE TO FIND      NADH 5 mg       * UNABLE TO FIND      DvMsx9e:  Amino acid mix daily       * UNABLE TO FIND      P5P 50 mg with Manganese 8 mg daily       * valACYclovir 500 MG tablet    VALTREX     Take 1,000mg every 6 hours to total 3,000mg daily       * valACYclovir 1000 mg tablet    VALTREX     TAKE 1 TABLET (1 GRAM) BY MOUTH THREE TIMES A DAY AND 1/2 TABLET (500 MG) AT BEDTIME .       vitamin   K1 1 MG/0.5ML Soln    AQUA-MEPHYTON         vitamin D 2000 UNITS Caps      Take 1,300 Units by mouth       Vitamin-B Complex Tabs      Take 1 tablet by mouth        zinc 50 MG Tabs      Take 1 tablet by mouth daily       * Notice:  This list has 6 medication(s) that are the same as other medications prescribed for you. Read the directions carefully, and ask your doctor or other care provider to review them with you.

## 2017-03-06 NOTE — PROGRESS NOTES
"[facility] CANCER CLINIC  FOLLOW-UP VISIT NOTE  Date of visit: 2-28-17          REASON FOR VISIT: MCAS follow up    HPI: Brenda is a 61 year old female with a history of a bicuspid aortic valve diagnosed at [age] years old and chronic EBV.  She sees multiple cardiologist as she has been struggling with whether or not to have this fixed.  [facility].  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  Please see his note for details of her PMH.  The following was cut/paste from his note regarding details of her diagnosis:     \"Current Diagnosis: Based on (1) a clinical history highly consistent with chronic/recurrent aberrant mast cell  release, (2) multiple elevated mast cell  levels in '15 (plasma heparin 0.05 anti-Factor Xa units/ml (upper normal 0.02 per Hector et al., J Thrombosis Haemostasis 2011), serum chromogranin A 127 ng/ml (with no evident heart or renal failure or neuroendocrine cancer or PPI use at the time), and 24-hour urinary 11-beta-prostaglandin-F2-alpha 1161 ng/24h (normal <= 1000)) (but normal serum tryptases), and (3) absence of any other evident disease better accounting for the full range and chronicity of the symptoms and findings here, mast cell activation syndrome (MCAS; 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 Kelly's lifelong complex of multisystem polymorbidity of generally inflammatory theme.\"    INTERVAL HISTORY: this is my first time meeting Brenda today.  She is still pondering whether or not it would be beneficial for her to have her bicuspid aortic valve replaced.  While most of her is inclined to do so, she knows that surgery can be difficult for mast-cell patients.  On the other hand- most of they physicians she sees tells her \"you don't have mast cell disease\" thus she wonders if she is putting off a surgery for an " "ailment she may not have.  She does have chronic fatigue, but wonders if this is from her chronic EBV.  She takes Valtrex and feels better on this and can tell if she even misses a dose. She was put on fluconazole but stopped as she got a stomach ache from it.  She has multiple food sensitivities as well.  She has many foods she avoids.  Also reactive to molds. She is currently taking 10 mg BID of loratidine and 150 mg BID of ranitidine.     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  Brenda had a few questions today regarding Dr Segal's role in the bob-operative period.  I encouraged if she does proceed with surgery that her anesthesiologist contact Dr Segal.  She expressed her concern regarding other physicians opinions regarding mast cell which makes it difficult for her.  She is taking many supplements at this time and is not very keen on taking more prescription medications.  We reviewed Dr Segal's note regarding possible supplements and dosing.  I reviewed with her ARI supplements, which she has taken before in low doses but had stopped as the  stopped making the actual brand/dose she was responding to.  We also reviewed quercetin, ALA, NAC and ASA as options in the future as well.     Maribel Pelletier PA-C    Over 25 min of direct face to face time spent with patient with more than 50% time spent in counseling and coordinating care.          "

## 2017-04-05 NOTE — MR AVS SNAPSHOT
After Visit Summary   4/5/2017    Brenda Kelly    MRN: [id number]           Patient Information     Date Of Birth          1955        Visit Information        Provider Department      4/5/2017 10:00 AM Lila Bowers MD [facility] and Infectious Diseases        Today's Diagnoses     Beau Arriaga virus infection    -  1    Chronic fatigue        Hilar adenopathy        Candida infection           Follow-ups after your visit        Your next 10 appointments already scheduled     Apr 05, 2017 11:30 AM CDT   Lab with  LAB   [facility] ([facility])    [address] [place] [address]  1st [facility] [address] [phone]            May 26, 2017 10:00 AM CDT   (Arrive by 9:45 AM)   Return Visit with Britton Fontenot MD   [facility] ([facility])    [address] [place] [address]  2nd [facility] [address] [phone]              Future tests that were ordered for you today     Open Future Orders        Priority Expected Expires Ordered    1,3 Beta D glucan fungitell Routine 4/5/2017 4/5/2018 4/5/2017    IgM Routine 4/5/2017 4/5/2018 4/5/2017    Quantiferon Gold (M Tuberculosis by Quantiferon) Routine 4/5/2017 4/5/2018 4/5/2017    Blood Culture AFB Routine 4/5/2017 4/5/2018 4/5/2017    Blood culture, aerobic Routine 4/5/2017 4/5/2018 4/5/2017    Erythrocyte sedimentation rate auto Routine 4/5/2017 4/5/2018 4/5/2017    CRP inflammation Routine 4/5/2017 4/5/2018 4/5/2017            Who to contact     If you have questions or need follow up information about today's clinic visit or your schedule please contact [facility] AND INFECTIOUS DISEASES directly at [phone].  Normal or non-critical lab and imaging results will be communicated to you by MyChart, letter or phone within 4 business days after the clinic has received the results. If you do not hear from us within 7 days, please  "contact the clinic through [employer] or phone. If you have a critical or abnormal lab result, we will notify you by phone as soon as possible.  Submit refill requests through [employer] or call your pharmacy and they will forward the refill request to us. Please allow 3 business days for your refill to be completed.          Additional Information About Your Visit        The [employer] Information     [employer] gives you secure access to your electronic health record. If you see a primary care provider, you can also send messages to your care team and make appointments. If you have questions, please call your primary care clinic.  If you do not have a primary care provider, please call [phone] and they will assist you.        Care EveryWhere ID     This is your Care EveryWhere ID. This could be used by other organizations to access your [place] medical records  [id number]        Your Vitals Were     Pulse Temperature Height Last Period BMI (Body Mass Index)       108 98.3  F (36.8  C) (Oral) 1.6 m (5' 3\") 01/01/2005 25.65 kg/m2        Blood Pressure from Last 3 Encounters:   04/05/17 142/74   02/28/17 127/82   01/13/17 147/73    Weight from Last 3 Encounters:   04/05/17 65.7 kg (144 lb 12.8 oz)   02/28/17 63.5 kg (140 lb)   01/13/17 65.3 kg (143 lb 14.4 oz)               Primary Care Provider Office Phone # Fax #    Hafsa Campos [phone]       [facility] [address] [place] [address]        Thank you!     Thank you for choosing [facility] AND INFECTIOUS DISEASES  for your care. Our goal is always to provide you with excellent care. Hearing back from our patients is one way we can continue to improve our services. Please take a few minutes to complete the written survey that you may receive in the mail after your visit with us. Thank you!             Your Updated Medication List - Protect others around you: Learn how to safely use, store and throw away your " medicines at www.disposemymeds.org.          This list is accurate as of: 4/5/17 11:07 AM.  Always use your most recent med list.                   Brand Name Dispense Instructions for use    Alpha-Lipoic Acid 200 MG Tabs      Take 400 mg by mouth       amitriptyline 10 MG tablet    ELAVIL     Take 10 mg by mouth       amoxicillin 500 MG capsule    AMOXIL     Reported on 2/28/2017       ASHWAGANDHA PO      daily       B-12 PO      1500 mcg B12 adenocobalamin       CALCIUM + D PO      500mg calcium & 500 units of D3 daily       CELEXA PO      Take 3 mLs by mouth daily       cetirizine 10 MG tablet    zyrTEC     Take 10 mg by mouth daily as needed for allergies Reported on 2/28/2017       D-Ribose Powd      as needed Reported on 2/28/2017       FAMCICLOVIR PO      Take 500 mg by mouth 3 times daily       Fish Oil 500 MG Caps          fluconazole 100 MG tablet    DIFLUCAN    90 tablet    Take 1 tablet (100 mg) by mouth daily       folic acid 400 MCG tablet    FOLVITE     Take 400 mcg by mouth       GAMMAGARD 20 GM/200ML Soln   Generic drug:  Immune Globulin (Human)      Inject 35 g into the vein Every 3 weeks       GLUTATHIONE PO      50 mg       Hormone Cream Base Crea      Compounded estriol vaginal cream 0.5 mg;  Apply once weekly       hydrocortisone sodium succinate 100 MG injection    solu-CORTEF     50 mg       Lactobacillus Acidophilus Powd      Use as directed.       levothyroxine 100 MCG tablet    SYNTHROID/LEVOTHROID     TAKE ONE TABLET BY MOUTH EVERY DAY BEFORE MORNING MEAL       lidocaine-prilocaine cream    EMLA     APPLY TO THE AFFECTED AREA AS DIRECTED 30 MINUTES PRIOR TO IV STARTS       liothyronine 5 MCG tablet    CYTOMEL     TAKE 1 TO 2 TABLETS BY MOUTH EVERY DAY       loratadine 10 MG capsule      Take 10 mg by mouth daily       * LORazepam 1 MG tablet    ATIVAN     Take 1 mg by mouth Reported on 2/28/2017       * LORazepam 0.5 MG tablet    ATIVAN    60 tablet    Take 1 tablet (0.5 mg) by mouth as  needed for anxiety       meloxicam 7.5 MG tablet    MOBIC     Reported on 2/28/2017       methocarbamol 500 MG tablet    ROBAXIN     Take 500 mg by mouth Reported on 2/28/2017       MOLYBDENUM PO      Molybdenum 62.5 mcg with l-glutathione 500 mg daily       prochlorperazine 25 MG Suppository    COMPAZINE    20 suppository    Place 1 suppository (25 mg) rectally as needed for nausea       pyridoxine 100 MG tablet    VITAMIN B-6     Take 100 mg by mouth daily       RA MILK THISTLE 200 MG Caps   Generic drug:  Milk Thistle      Take as directed.       raloxifene 60 MG tablet    Evista     Take 60 mg by mouth       ranitidine 150 MG capsule    ZANTAC    180 capsule    Take 1 capsule (150 mg) by mouth daily       SM COENZYME Q-10 100 MG Caps capsule   Generic drug:  co-enzyme Q-10      Take 100 mg by mouth       temazepam 15 MG capsule    RESTORIL     1-2 at bedtime.       * UNABLE TO FIND      Nut 950:  Contains:  Vit A, C, D, E, B1, B2, ribo 5 phosphate, niacinamide, inositol, B6, P5P, MTHF, B12, biotin, pantothenic acid, calcium citrate, iodine, magnesium, zinc, selenixum, manganese, vanadium, chromium, potassium, boron       * UNABLE TO FIND      NADH 5 mg       * UNABLE TO FIND      [id number]:  Amino acid mix daily       * UNABLE TO FIND      P5P 50 mg with Manganese 8 mg daily       vitamin   K1 1 MG/0.5ML Soln    AQUA-MEPHYTON         vitamin D 2000 UNITS Caps      Take 1,300 Units by mouth       Vitamin-B Complex Tabs      Take 1 tablet by mouth       zinc 50 MG Tabs      Take 1 tablet by mouth daily       * Notice:  This list has 6 medication(s) that are the same as other medications prescribed for you. Read the directions carefully, and ask your doctor or other care provider to review them with you.

## 2017-04-05 NOTE — PROGRESS NOTES
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  0.3   < >  0.4   DBIL  0.1  0.1   < >   --    --    --    --    --    ALKPHOS  97  74   --   78   < >  82   < >  97   PROTTOTAL  8.2  8.0   --   7.8   < >  7.5   < >   --    ALBUMIN  4.0  3.9   --   4.2   < >  3.8   < >   --    AST  52*  40   --   33   < >  26   < >  55*   ALT  51*  38   --   32   < >  27   < >   --    LDH   --    --    --    --    --   179   --   603    < > = values in this interval not displayed.       Hematology Studies     Recent Labs   Lab Test 03/06/17 08/11/15  06/12/15   1154  04/29/15   0748   WBC  4.4  7.5  5.1  5.3   ANEU  2.7  5.1  3.4  3.3   ALYM  1.1  1.4  1.0  1.1   RENA  0.4  0.9  0.6  0.6   AEOS  0.1  0.1  0.1  0.2   HGB  12.6  13.8  13.0  13.0   HCT  37.1  40.7  38.9  39.8   PLT  165  226  169  165       Clotting Studies    Recent Labs   Lab Test 10/31/12   INR  1.0       Iron Testing    Recent Labs   Lab Test 03/06/17 12/07/16   1155 08/11/15  06/12/15   1154  04/29/15   0748  02/04/15   1036 02/22/12   AMANDA   --   93   --    --    --    --    --    MCV  101   --   106*  109*  108*  108*  92.0   FOLIC   --   17.5   --    --    --    --    --    B12   --   928   --     --    --    --    --        Thyroid Studies     Recent Labs   Lab Test  02/04/15   1036   TSH  1.00       Microbiology:  Beta D Glucan levels (Fungitell assay)    Recent Labs   Lab Test  01/13/17   1024  12/07/16   1155   FGTL  392  >500  Unit: pg/mL         Last 6 Culture results with specimen source  Culture Micro   Date Value Ref Range Status   12/07/2016 No acid fast bacilli isolated after 6 weeks  Final   12/07/2016 No growth  Final   12/07/2016 No growth after 4 weeks  Final   02/04/2015 No growth  Final    Specimen Description   Date Value Ref Range Status   12/07/2016 Blood Left Arm  Final   12/07/2016 Blood Left Arm  Final   12/07/2016 Blood Left Arm  Final   12/07/2016 Blood SMEAR  Final   02/04/2015 Blood Unspecified Site  Final   08/12/2009 Peripheral blood  Final   08/12/2009 Serum  Final          Virology:  EBV DNA Copies/mL   Date Value Ref Range Status   12/07/2016 EBV DNA Not Detected EBVNEG [Copies]/mL Final       CMV viral loads    Recent Labs   Lab Test  02/04/15   1036   CSPEC  Whole blood, EDTA anticoagulant       HHV6 DNA Result   Date Value Ref Range Status   02/04/2015 No HHV6 DNA detected <500 Copies/mL Final       Hepatitis C Antibody   Date Value Ref Range Status   02/07/2006 Negative NEG Final       CMV IgG Antibody   Date Value Ref Range Status   08/12/2009 0.0 EU/mL Final     Comment:     Negative for anti-CMV IgG     CMV IgM Antibody   Date Value Ref Range Status   08/12/2009 <0.90 <0.90 Final     Comment:     No detectable antibody.       EBV IgG Antibody Interpretation   Date Value Ref Range Status   05/23/2007 Positive, suggests immunologic exposure.  Final       Pathology:  2/4/2015 Peripheral Blood Smear: Non anemic peripheral blood with macroctyic indices     6/11/2010 29 Slides, case #[id number]. Subcarinal lymph node, endoscopic ultrasound-guided fine needle aspiration ([id number] obtained 10/06/09). Non-necrotizing granulomas. Ximena, GMS, Diff-Quik and Gram stain negative for  organisms. Negative for malignancy.    Imaging:  MRI Angiogram chest w & w/o contrast    Addendum: 10/21/2015    MRA CHEST W/O &T& W CONTRAST ANGIOGRAM, MR CARDIAC W CONTRAST W FLOW QUANT   Date:  10/6/2015 2:32 PM  IMPRESSION:   1.  Mildly enlarged left ventricular size with normal systolic function with a calculated ejection fraction of  56 %.  2.  Normal right ventricular size and systolic function with a calculated ejection fraction of 60%.   3.  Congenitally bicuspid aortic valve with moderate aortic insufficiency (regurgitant volume 28 mL; regurgitant fraction 31%).  4.  On delayed enhancement imaging, there is no abnormal hyperenhancement to suggest myocardial scar / inflammation / infiltration

## 2017-04-05 NOTE — LETTER
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   9.0  9.3  8.7   MAG   --   2.3   --    --    CKT  73   --    --    --        Hepatic Studies    Recent Labs   Lab Test  04/05/17   1203  01/13/17   1024   06/12/15   1154   02/04/15   1036   08/09/11   1039   BILITOTAL  0.4  0.5   --   0.4   < >  0.3   < >  0.4   DBIL  0.1  0.1   < >   --    --    --    --    --    ALKPHOS  97  74   --   78   < >  82   < >  97   PROTTOTAL  8.2  8.0   --   7.8   < >  7.5   < >   --    ALBUMIN  4.0  3.9   --   4.2   < >  3.8   < >   --    AST  52*  40   --   33   < >  26   < >  55*   ALT  51*  38   --   32   < >  27   < >   --    LDH   --    --    --    --    --   179   --   603    < > = values in this interval not displayed.       Hematology Studies     Recent Labs   Lab Test 03/06/17 08/11/15  06/12/15   1154  04/29/15   0748   WBC  4.4  7.5  5.1  5.3   ANEU  2.7  5.1  3.4  3.3   ALYM  1.1  1.4  1.0  1.1   RENA  0.4  0.9  0.6  0.6   AEOS  0.1  0.1  0.1  0.2   HGB  12.6  13.8  13.0  13.0   HCT  37.1  40.7  38.9  39.8   PLT  165  226  169  165       Clotting Studies    Recent Labs   Lab  Test 10/31/12   INR  1.0       Iron Testing    Recent Labs   Lab Test 03/06/17 12/07/16   1155 08/11/15  06/12/15   1154  04/29/15   0748  02/04/15   1036 02/22/12   AMANDA   --   93   --    --    --    --    --    MCV  101   --   106*  109*  108*  108*  92.0   FOLIC   --   17.5   --    --    --    --    --    B12   --   928   --    --    --    --    --        Thyroid Studies     Recent Labs   Lab Test  02/04/15   1036   TSH  1.00       Microbiology:  Beta D Glucan levels (Fungitell assay)    Recent Labs   Lab Test  01/13/17   1024  12/07/16   1155   FGTL  392  >500  Unit: pg/mL         Last 6 Culture results with specimen source  Culture Micro   Date Value Ref Range Status   12/07/2016 No acid fast bacilli isolated after 6 weeks  Final   12/07/2016 No growth  Final   12/07/2016 No growth after 4 weeks  Final   02/04/2015 No growth  Final    Specimen Description   Date Value Ref Range Status   12/07/2016 Blood Left Arm  Final   12/07/2016 Blood Left Arm  Final   12/07/2016 Blood Left Arm  Final   12/07/2016 Blood SMEAR  Final   02/04/2015 Blood Unspecified Site  Final   08/12/2009 Peripheral blood  Final   08/12/2009 Serum  Final          Virology:  EBV DNA Copies/mL   Date Value Ref Range Status   12/07/2016 EBV DNA Not Detected EBVNEG [Copies]/mL Final       CMV viral loads    Recent Labs   Lab Test  02/04/15   1036   CSPEC  Whole blood, EDTA anticoagulant       HHV6 DNA Result   Date Value Ref Range Status   02/04/2015 No HHV6 DNA detected <500 Copies/mL Final       Hepatitis C Antibody   Date Value Ref Range Status   02/07/2006 Negative NEG Final       CMV IgG Antibody   Date Value Ref Range Status   08/12/2009 0.0 EU/mL Final     Comment:     Negative for anti-CMV IgG     CMV IgM Antibody   Date Value Ref Range Status   08/12/2009 <0.90 <0.90 Final     Comment:     No detectable antibody.       EBV IgG Antibody Interpretation   Date Value Ref Range Status   05/23/2007 Positive, suggests immunologic exposure.   Final       Pathology:  2/4/2015 Peripheral Blood Smear: Non anemic peripheral blood with macroctyic indices     6/11/2010 29 Slides, case #[id number]. Subcarinal lymph node, endoscopic ultrasound-guided fine needle aspiration ([id number] obtained 10/06/09). Non-necrotizing granulomas. Ximena, GMS, Diff-Quik and Gram stain negative for organisms. Negative for malignancy.    Imaging:  MRI Angiogram chest w & w/o contrast    Addendum: 10/21/2015    MRA CHEST W/O &T& W CONTRAST ANGIOGRAM, MR CARDIAC W CONTRAST W FLOW QUANT   Date:  10/6/2015 2:32 PM  IMPRESSION:   1.  Mildly enlarged left ventricular size with normal systolic function with a calculated ejection fraction of  56 %.  2.  Normal right ventricular size and systolic function with a calculated ejection fraction of 60%.   3.  Congenitally bicuspid aortic valve with moderate aortic insufficiency (regurgitant volume 28 mL; regurgitant fraction 31%).  4.  On delayed enhancement imaging, there is no abnormal hyperenhancement to suggest myocardial scar / inflammation / infiltration           Again, thank you for allowing me to participate in the care of your patient.      Sincerely,    Lila Bowers MD

## 2017-04-05 NOTE — NURSING NOTE
"Chief Complaint   Patient presents with     RECHECK     3 month follow up       Initial /74  Pulse 108  Temp 98.3  F (36.8  C) (Oral)  Ht 1.6 m (5' 3\")  Wt 65.7 kg (144 lb 12.8 oz)  LMP 01/01/2005  BMI 25.65 kg/m2 Estimated body mass index is 25.65 kg/(m^2) as calculated from the following:    Height as of this encounter: 1.6 m (5' 3\").    Weight as of this encounter: 65.7 kg (144 lb 12.8 oz).  Medication Reconciliation: complete  "

## 2017-04-06 PROBLEM — B37.9 CANDIDA INFECTION: Status: ACTIVE | Noted: 2017-01-01

## 2017-05-16 NOTE — TELEPHONE ENCOUNTER
Patient called --- is going to have valve replacement (tissue valve) at [facility].   Is wondering if there's any specific instruction related to MCAS & medications during surgery.  She is seeking advice today.    Last surgery was in 2000;  She knows she has had major nausea with some surgeries, but isn't even sure which ones.  She is going to try to request records from past hospitalizations to prepared for this surgery.      She current states she does have bouts of nausea that she's never really pinpointed the cause for -- except that 2 or more beers can trigger it.  She knows prochlorperazine works well for her to manage nausea;  Uses the suppositories.      Educated patient that she should let anesthesiology know about her MCAS diagnosis, and that she may react to medications quickly in unusual ways so that they can respond quickly.  Also advised her to let her care team know that her nausea is typically controlled with prochlorperazine so that this can be tried first if she does have major nausea.  Patient voiced understanding and agreement.      Patient also asked about risk of raloxifene with clotting in a tissue valve.  She does have a history of breast cancer, but is on raloxifene for osteoporosis.  Encouraged her to discuss this with cardiology;  Could take a break from the raloxifene if desired;  Could also consider alternative osteoporosis treament;  She has done 2 years of teriparatide and didn't tolerate oral bisphosphonates.  Some concern about suppressed immune system, so Prolia was not pursued.  Educated patient the IV bisphosphonates could be an option she could discuss with her health care team.      Tamara Monson, Pharm.D., BCPS

## 2017-06-27 NOTE — PROGRESS NOTES
I called Brenda on her cell phone number in response to a message earlier today. Brenda has had heart surgery at [facility], she is still inpatient. She has had some runs of ventricular tachycardia, and she is wearing a life vest for the next one to three months. She has developed a cardiomyopathy. Her cardiologist at [facility] would like her to come  Of fluconazole, as it has the potential to prolong her QT interval. Her fluconazole dose is 100 mg daily and she has normal kidney function, so while the dose is  Low, I agree that it would be good for her to come off of fluconazole. She will  Relate that to her medical providers when they round on her later today. I have updated her med List. She will be getting to us an updated medication list as soon as she can. Lila Bowers MD 10:57 AM

## 2017-07-31 NOTE — PROGRESS NOTES
Spoke with pt this afternoon and apologized for not having opportunity to call her back earlier.  Told her did communicate with Dr. Fontenot and unfortunately in his time here, he did not identify a cardiologist in [place] who understands MCAS.    Reviewed providers in [facility] that see MCAS patients (Saw Eldridge, Abdirizak, & Harmeet) as well as heard Dr. Eldridge is not taking new patients at this time.

## 2017-08-22 NOTE — TELEPHONE ENCOUNTER
"Patient called with questions on medications.  Recently had heart surgery to place a tissue valve -- had reduction in heart function as a result of the surgery.  Is wondering if it could be from medications or the surgery itself.  Was in the hospital for 8 days afterward from surgery with heart arrhythmias.   States she was told she had a \"Catecholamine storm during surgery\" and was told to talk to a pharmacist about this.  States her blood pressure was all over the map.  Surgery was at [facility].        Was given hydrocortisone after surgery, got a different  of IVIG after the surgery.   She will review her notes from the hospital and send me a list of the medications she received via [facility].  I will review and respond via [employer].    Tamara Monson, Pharm.D., BCPS                   "

## 2017-09-11 NOTE — PROGRESS NOTES
Brenda called:  1) she has been taken off diflucan, and wants to know if there is anything to do about that.  2) she had Dr. Bynum recheck her lymphocyte profile, does not know how to interpret results. CD3 is 52 (345 abs), cd3/cd4 is 28 (174 absolute), cd5 is 53, others are high. Mixed result.   3) her primary has asked her to get off the valcyte.  She will plan to make an office visit.   Lila Bowers, 3:20 PM

## 2021-06-02 ENCOUNTER — RECORDS - HEALTHEAST (OUTPATIENT)
Dept: ADMINISTRATIVE | Facility: CLINIC | Age: 66
End: 2021-06-02